# Patient Record
Sex: MALE | Race: ASIAN | NOT HISPANIC OR LATINO | URBAN - METROPOLITAN AREA
[De-identification: names, ages, dates, MRNs, and addresses within clinical notes are randomized per-mention and may not be internally consistent; named-entity substitution may affect disease eponyms.]

---

## 2017-11-10 ENCOUNTER — TELEPHONE (OUTPATIENT)
Dept: FAMILY MEDICINE | Facility: CLINIC | Age: 22
End: 2017-11-10

## 2017-11-10 NOTE — TELEPHONE ENCOUNTER
N is requesting the OT to assess and treat the lymphedema given they specialize in lymphedema care. Gave verbal okay to do this right away.    Routed to Dr. Murray. /JOSE ALEJANDRO Hua

## 2017-11-10 NOTE — TELEPHONE ENCOUNTER
[facility] phone call message- general phone call:    Reason for call: She got a witting note from  for a daily lympedman wrap, she wants to know if its ok to have a ot to gp out and evaluate his frequency need for that.    Return call needed: Yes    OK to leave a message on voice mail? Yes    Primary language: English      needed? No    Call taken on November 10, 2017 at 11:02 AM by Linda Kelly

## 2018-10-12 ENCOUNTER — OFFICE VISIT - HEALTHEAST (OUTPATIENT)
Dept: FAMILY MEDICINE | Facility: CLINIC | Age: 23
End: 2018-10-12

## 2018-10-12 DIAGNOSIS — Z13.220 LIPID SCREENING: ICD-10-CM

## 2018-10-12 DIAGNOSIS — R07.81 RIB PAIN ON LEFT SIDE: ICD-10-CM

## 2018-10-12 DIAGNOSIS — Z11.3 SCREENING FOR STD (SEXUALLY TRANSMITTED DISEASE): ICD-10-CM

## 2018-10-12 DIAGNOSIS — Z00.00 ROUTINE GENERAL MEDICAL EXAMINATION AT A HEALTH CARE FACILITY: ICD-10-CM

## 2018-10-12 DIAGNOSIS — Z13.1 DIABETES MELLITUS SCREENING: ICD-10-CM

## 2018-10-12 LAB
CHOLEST SERPL-MCNC: 172 MG/DL
FASTING STATUS PATIENT QL REPORTED: YES
FASTING STATUS PATIENT QL REPORTED: YES
GLUCOSE BLD-MCNC: 89 MG/DL (ref 70–125)
HDLC SERPL-MCNC: 53 MG/DL
LDLC SERPL CALC-MCNC: 99 MG/DL
TRIGL SERPL-MCNC: 102 MG/DL

## 2018-10-12 ASSESSMENT — MIFFLIN-ST. JEOR: SCORE: 1600.09

## 2018-10-15 ENCOUNTER — COMMUNICATION - HEALTHEAST (OUTPATIENT)
Dept: FAMILY MEDICINE | Facility: CLINIC | Age: 23
End: 2018-10-15

## 2018-10-15 LAB
C TRACH DNA SPEC QL PROBE+SIG AMP: NEGATIVE
N GONORRHOEA DNA SPEC QL NAA+PROBE: NEGATIVE

## 2019-08-09 ENCOUNTER — OFFICE VISIT - HEALTHEAST (OUTPATIENT)
Dept: FAMILY MEDICINE | Facility: CLINIC | Age: 24
End: 2019-08-09

## 2019-08-09 DIAGNOSIS — Z11.3 SCREEN FOR STD (SEXUALLY TRANSMITTED DISEASE): ICD-10-CM

## 2019-08-09 DIAGNOSIS — B35.6 JOCK ITCH: ICD-10-CM

## 2019-08-09 DIAGNOSIS — Z00.00 ROUTINE GENERAL MEDICAL EXAMINATION AT A HEALTH CARE FACILITY: ICD-10-CM

## 2019-08-09 LAB
HBV SURFACE AG SERPL QL IA: NEGATIVE
HCV AB SERPL QL IA: NEGATIVE
HIV 1+2 AB+HIV1 P24 AG SERPL QL IA: NEGATIVE

## 2019-08-10 LAB — T PALLIDUM AB SER QL: NEGATIVE

## 2019-08-12 LAB
C TRACH DNA SPEC QL PROBE+SIG AMP: NEGATIVE
N GONORRHOEA DNA SPEC QL NAA+PROBE: NEGATIVE

## 2019-08-13 ENCOUNTER — COMMUNICATION - HEALTHEAST (OUTPATIENT)
Dept: FAMILY MEDICINE | Facility: CLINIC | Age: 24
End: 2019-08-13

## 2020-07-06 ENCOUNTER — VIRTUAL VISIT (OUTPATIENT)
Dept: FAMILY MEDICINE | Facility: OTHER | Age: 25
End: 2020-07-06

## 2020-07-06 ENCOUNTER — AMBULATORY - HEALTHEAST (OUTPATIENT)
Dept: FAMILY MEDICINE | Facility: CLINIC | Age: 25
End: 2020-07-06

## 2020-07-06 ENCOUNTER — COMMUNICATION - HEALTHEAST (OUTPATIENT)
Dept: SCHEDULING | Facility: CLINIC | Age: 25
End: 2020-07-06

## 2020-07-06 DIAGNOSIS — Z20.822 SUSPECTED COVID-19 VIRUS INFECTION: ICD-10-CM

## 2020-07-06 NOTE — PROGRESS NOTES
"Date: 2020 08:55:20  Clinician: Wilberto Fonseca  Clinician NPI: [id number]  Patient: Rocco Ochoa  Patient : 1995  Patient Address: [facility] [place] [address]  Patient Phone: [phone]  Visit Protocol: URI  Patient Summary:  Rocco is a 25 year old ( : 1995 ) male who initiated a Visit for COVID-19 (Coronavirus) evaluation and screening. When asked the question \"Please sign me up to receive news, health information and promotions from [employer].\", Rocco responded \"No\".    Rocco states his symptoms started suddenly 3-4 days ago. After his symptoms started, they improved and then got worse again.   His symptoms consist of diarrhea, rhinitis, malaise, a sore throat, myalgia, a cough, and nasal congestion. Rocco also feels feverish but was unable to measure his temperature.   Symptom details     Nasal secretions: The color of his mucus is yellow and clear.    Cough: Rocco coughs every 5-10 minutes and his cough is more bothersome at night. Phlegm does not come into his throat when he coughs. He does not believe his cough is caused by post-nasal drip.     Sore throat: Rocco reports having moderate throat pain (4-6 on a 10 point pain scale), does not have exudate on his tonsils, and can swallow liquids. He is not sure if the lymph nodes in his neck are enlarged. A rash has not appeared on the skin since the sore throat started.      Rocco denies having wheezing, nausea, teeth pain, ageusia, vomiting, ear pain, headache, chills, anosmia, and facial pain or pressure. He also denies having recent facial or sinus surgery in the past 60 days, taking antibiotic medication in the past month, and having a sinus infection within the past year. He is not experiencing dyspnea.   Precipitating events  Within the past week, Rocco has been exposed to someone with strep throat. He has not recently been exposed to someone with influenza. Rocco has not been in close contact with any " high risk individuals.   Pertinent COVID-19 (Coronavirus) information  In the past 14 days, Rocco has not worked in a congregate living setting.   He does not work or volunteer as healthcare worker or a  and does not work or volunteer in a healthcare facility.   Rocco also has not lived in a congregate living setting in the past 14 days. He does not live with a healthcare worker.   Rocco has had a close contact with a laboratory-confirmed COVID-19 patient within 14 days of symptom onset. Additional information about contact with COVID-19 (Coronavirus) patient as reported by the patient (free text): My girlfriend got confirmed 7/5/20 that she tested positive for COVID and i was with her the whole time.   Pertinent medical history  Rocco does not need a return to work/school note.   Weight: 160 lbs   Rocco does not smoke or use smokeless tobacco.   Weight: 160 lbs    MEDICATIONS: No current medications, ALLERGIES: NKDA  Clinician Response:  Dear Rocco,   Your symptoms show that you may have coronavirus (COVID-19). This illness can cause fever, cough and trouble breathing. Many people get a mild case and get better on their own. Some people can get very sick.  What should I do?  We would like to test you for this virus.   1. Please call [phone] to schedule your visit. Explain that you were referred by [facility] to have a COVID-19 test. Be ready to share your [facility] visit ID number.  The following will serve as your written order for this COVID Test, ordered by me, for the indication of suspected COVID [Z20.828]: The test will be ordered in [employer], our electronic health record, after you are scheduled. It will show as ordered and authorized by Rey Luciano MD.  Order: COVID-19 (Coronavirus) PCR for SYMPTOMATIC testing from [facility].      2. When it's time for your COVID test:  Stay at least 6 feet away from others. (If someone will drive you to your test, stay in the backseat, as far away from  "the  as you can.)   Cover your mouth and nose with a mask, tissue or washcloth.  Go straight to the testing site. Don't make any stops on the way there or back.      3.Starting now: Stay home and away from others (self-isolate) until:   You've had no fever---and no medicine that reduces fever---for 3 full days (72 hours). And...   Your other symptoms have gotten better. For example, your cough or breathing has improved. And...   At least 10 days have passed since your symptoms started.       During this time, don't leave the house except for testing or medical care.   Stay in your own room, even for meals. Use your own bathroom if you can.   Stay away from others in your home. No hugging, kissing or shaking hands. No visitors.  Don't go to work, school or anywhere else.    Clean \"high touch\" surfaces often (doorknobs, counters, handles, etc.). Use a household cleaning spray or wipes. You'll find a full list of  on the EPA website: www.epa.gov/pesticide-registration/list-n-disinfectants-use-against-sars-cov-2.   Cover your mouth and nose with a mask, tissue or washcloth to avoid spreading germs.  Wash your hands and face often. Use soap and water.  Caregivers in these groups are at risk for severe illness due to COVID-19:  o People 65 years and older  o People who live in a nursing home or long-term care facility  o People with chronic disease (lung, heart, cancer, diabetes, kidney, liver, immunologic)  o People who have a weakened immune system, including those who:   Are in cancer treatment  Take medicine that weakens the immune system, such as corticosteroids  Had a bone marrow or organ transplant  Have an immune deficiency  Have poorly controlled HIV or AIDS  Are obese (body mass index of 40 or higher)  Smoke regularly   o Caregivers should wear gloves while washing dishes, handling laundry and cleaning bedrooms and bathrooms.  o Use caution when washing and drying laundry: Don't shake dirty " laundry, and use the warmest water setting that you can.  o For more tips, go to www.cdc.gov/coronavirus/2019-ncov/downloads/10Things.pdf.    4.Sign up for [employer]. We know it's scary to hear that you might have COVID-19. We want to track your symptoms to make sure you're okay over the next 2 weeks. Please look for an email from [employer]---this is a free, online program that we'll use to keep in touch. To sign up, follow the link in the email. Learn more at [email or web address]  How can I take care of myself?   Get lots of rest. Drink extra fluids (unless a doctor has told you not to).   Take Tylenol (acetaminophen) for fever or pain. If you have liver or kidney problems, ask your family doctor if it's okay to take Tylenol.   Adults can take either:    650 mg (two 325 mg pills) every 4 to 6 hours, or...   1,000 mg (two 500 mg pills) every 8 hours as needed.    Note: Don't take more than 3,000 mg in one day. Acetaminophen is found in many medicines (both prescribed and over-the-counter medicines). Read all labels to be sure you don't take too much.   For children, check the Tylenol bottle for the right dose. The dose is based on the child's age or weight.    If you have other health problems (like cancer, heart failure, an organ transplant or severe kidney disease): Call your specialty clinic if you don't feel better in the next 2 days.       Know when to call 911. Emergency warning signs include:    Trouble breathing or shortness of breath Pain or pressure in the chest that doesn't go away Feeling confused like you haven't felt before, or not being able to wake up Bluish-colored lips or face.  Where can I get more information?    [employer] [facility] -- About COVID-19: [email or web address]   CDC -- What to Do If You're Sick: www.cdc.gov/coronavirus/2019-ncov/about/steps-when-sick.html   CDC -- Ending Home Isolation: www.cdc.gov/coronavirus/2019-ncov/hcp/disposition-in-home-patients.html    CDC -- Caring for Someone: www.cdc.gov/coronavirus/2019-ncov/if-you-are-sick/care-for-someone.html   [facility] -- Interim Guidance for Hospital Discharge to Home: www.health.[facility].[place]/diseases/coronavirus/hcp/hospdischarge.pdf   [facility] clinical trials (COVID-19 research studies): clinicalaffairs.[facility]-clinical-trials    Below are the COVID-19 hotlines at the [place] Department of Health ([facility]). Interpreters are available.    For health questions: Call 528-741-4316 or 1-965.508.2466 (7 a.m. to 7 p.m.) For questions about schools and childcare: Call 889-163-6734 or 1-684.159.9613 (7 a.m. to 7 p.m.)    Diagnosis: Cough  Diagnosis ICD: R05

## 2020-07-07 ENCOUNTER — AMBULATORY - HEALTHEAST (OUTPATIENT)
Dept: FAMILY MEDICINE | Facility: CLINIC | Age: 25
End: 2020-07-07

## 2020-07-07 DIAGNOSIS — Z20.822 SUSPECTED COVID-19 VIRUS INFECTION: ICD-10-CM

## 2020-07-11 ENCOUNTER — COMMUNICATION - HEALTHEAST (OUTPATIENT)
Dept: FAMILY MEDICINE | Facility: CLINIC | Age: 25
End: 2020-07-11

## 2020-12-29 ENCOUNTER — OFFICE VISIT - HEALTHEAST (OUTPATIENT)
Dept: FAMILY MEDICINE | Facility: CLINIC | Age: 25
End: 2020-12-29

## 2020-12-29 DIAGNOSIS — Z00.00 ROUTINE GENERAL MEDICAL EXAMINATION AT A HEALTH CARE FACILITY: ICD-10-CM

## 2020-12-29 ASSESSMENT — MIFFLIN-ST. JEOR: SCORE: 1680.54

## 2021-05-28 ENCOUNTER — RECORDS - HEALTHEAST (OUTPATIENT)
Dept: ADMINISTRATIVE | Facility: CLINIC | Age: 26
End: 2021-05-28

## 2021-05-31 NOTE — TELEPHONE ENCOUNTER
Patient Returning Call  Reason for call:  lmtcb   Information relayed to patient: Relayed results note and patient agrees. Patient has no further questions regarding this matter.   Patient has additional questions:  No  If YES, what are your questions/concerns:  NA  Okay to leave a detailed message?: No call back needed

## 2021-05-31 NOTE — TELEPHONE ENCOUNTER
Left message to call back for: test results within this encounter . Please give Dr Diaz notes below

## 2021-05-31 NOTE — PROGRESS NOTES
Assessment/Plan:   Rocco is a 24-year-old male here for physical    1. Routine general medical examination at a health care facility  Physical completed today.  Vaccines as below.  Labs as below.  Possible varicocele, will continue to monitor.  - Tdap vaccine,  6yo or older,  IM  - HPV vaccine 9 valent 3 dose IM    2. Screen for STD (sexually transmitted disease)  Screen for sexual transmitted diseases as below  - Chlamydia trachomatis, DNA, amp probe  - Hepatitis B Surface antigen (HBsAG)  - Hepatitis C Antibody (Anti-HCV)  - RPR  - HIV Antigen/Antibody Screening Cascade    3. Jock itch  Suspect jock itch based on history, will treat with ketoconazole cream  - ketoconazole (NIZORAL) 2 % cream; Apply topically daily. As needed for itch/rash  Dispense: 30 g; Refill: 0    I have had an Advance Directives discussion with the patient.    Follow up: 1 yr for physical    Luisa Hernandez MD  [facility]    Subjective:     Rocco Ochoa Jr is a 24 y.o. male who presents for an annual exam.     Cough:  -1 week ago, overall getting better but still lots of mucus  -no sore throat or swallowing issues  -no fevers  -no sick contacts (maybe at work)  -no nose or ear issues  -allergic to cats     Possible jock itch:  -pt reports having issues over past week or so with itchy scrotum in the evening after working outside [employer]  -hasn't noticed any specific rash or lesions  -had jock itch a few years ago and treated with cream  -not currently sexually active    Healthy Habits:   Healthy Diet: likes fruits, overall good  Regular Exercise: doing pretty well, works as  and bar on weekends  Sunscreen Use: Yes  Dental Visits Regularly: Yes  Seat Belt: Yes  Wears glasses, due for visit    Health Maintenance reviewed:  Lipid Profile: does not need  Glucose Screen: does not need  Colonoscopy: No    Immunization History   Administered Date(s) Administered     DTaP, historic 1995, 1995, 1995,  05/10/1996, 06/23/2000     HPV 9 Valent 08/09/2019     HPV Quadrivalent 08/28/2012     Hep A, historic 08/26/2011, 08/28/2012     Hep B, historic 1995, 1995, 1995     HiB, historic,unspecified 1995, 1995, 1995, 05/10/1996     IPV 1995, 1995, 1995, 06/23/2000     MMR 05/10/1996, 06/23/2000     Meningococcal MCV4P 07/27/2007, 08/26/2011     Td,adult,historic,unspecified 07/27/2007     Tdap 07/27/2007, 08/09/2019     Immunization status: up to date and documented.    Current Outpatient Medications   Medication Sig Dispense Refill     ketoconazole (NIZORAL) 2 % cream Apply topically daily. As needed for itch/rash 30 g 0     No current facility-administered medications for this visit.      History reviewed. No pertinent past medical history.  Past Surgical History:   Procedure Laterality Date     MANDIBLE SURGERY      under bite     WISDOM TOOTH EXTRACTION       Patient has no known allergies.  Family History   Problem Relation Age of Onset     No Medical Problems Mother      Stroke Father      No Medical Problems Sister      No Medical Problems Maternal Grandmother      No Medical Problems Maternal Grandfather      No Medical Problems Paternal Grandmother      Heart attack Paternal Grandfather      Colon cancer Neg Hx      Prostate cancer Neg Hx      Social History     Socioeconomic History     Marital status: Single     Spouse name: Not on file     Number of children: Not on file     Years of education: Not on file     Highest education level: Not on file   Occupational History     Not on file   Social Needs     Financial resource strain: Not on file     Food insecurity:     Worry: Not on file     Inability: Not on file     Transportation needs:     Medical: Not on file     Non-medical: Not on file   Tobacco Use     Smoking status: Current Some Day Smoker     Smokeless tobacco: Never Used     Tobacco comment: social   Substance and Sexual Activity     Alcohol use:  Yes     Frequency: 2-3 times a week     Drinks per session: 1 or 2     Binge frequency: Never     Drug use: No     Sexual activity: Yes     Partners: Female   Lifestyle     Physical activity:     Days per week: Not on file     Minutes per session: Not on file     Stress: Not on file   Relationships     Social connections:     Talks on phone: Not on file     Gets together: Not on file     Attends Zoroastrianism service: Not on file     Active member of club or organization: Not on file     Attends meetings of clubs or organizations: Not on file     Relationship status: Not on file     Intimate partner violence:     Fear of current or ex partner: Not on file     Emotionally abused: Not on file     Physically abused: Not on file     Forced sexual activity: Not on file   Other Topics Concern     Not on file   Social History Narrative     Not on file       Review of Systems  General:  Denies problem  Eyes: Denies problem  Ears/Nose/Throat: Denies problem  Cardiovascular: Denies problem  Respiratory:  Denies problem  Gastrointestinal:  Denies problem   Genitourinary: Denies problem  Musculoskeletal:  Denies problem  Skin: Denies problem except scrotal itching  Neurologic: Denies problem  Psychiatric: Denies problem  Endocrine: Denies problem  Heme/Lymphatic: Denies problem   Allergic/Immunologic: Denies problem    Objective:        Vitals:    08/09/19 0937   BP: 140/80   Pulse: 73   SpO2: 99%   Weight: 137 lb 5 oz (62.3 kg)     Body mass index is 20.43 kg/m .    Physical Exam:  General Appearance: Alert, pleasant, appears stated age  Head: Normocephalic, without obvious abnormality  Eyes: PERRL, conjunctiva/corneas clear, EOM's intact  Ears: Normal TM's and external ear canals, both ears  Nose: Nares normal, septum midline,mucosa normal, no drainage  Throat: Lips, mucosa, and tongue normal; teeth and gums normal; oropharynx is clear  Neck: Supple,without lymphadenopathy, no thyromegaly or nodules noted  Lungs: Clear to  auscultation bilaterally, respirations unlabored, no wheezing or crackles  Heart: Regular rate and rhythm, no murmur   Abdomen: Soft, non-tender, no masses, no organomegaly  Genitourinary: Testes of normal size without masses, no inguinal hernias; suspect left sided (and possibly small right sided) varicocele but normal testicle size; no rashes or lesions on scrotum  Extremities: Extremities with strong and symmetric pulses, no cyanosis or edema  Skin: Skin color, texture normal, no rashes or lesions  Neurologic: Grossly normal, no focal deficits

## 2021-05-31 NOTE — TELEPHONE ENCOUNTER
----- Message from Ángela Lewis CMA sent at 8/13/2019  8:14 AM CDT -----      ----- Message -----  From: Luisa Hernandez MD  Sent: 8/12/2019   4:31 PM  To: Luisa Hernandez Care Team Pool    Please send letter with test results.    Rocco,    You tested negative for STDs including: HIV, gonorrhea, chlamydia, syphilis and hepatitis. This is great news!    Thanks,  Dr Hernandez

## 2021-06-02 ENCOUNTER — RECORDS - HEALTHEAST (OUTPATIENT)
Dept: ADMINISTRATIVE | Facility: CLINIC | Age: 26
End: 2021-06-02

## 2021-06-02 VITALS — BODY MASS INDEX: 20.69 KG/M2 | HEIGHT: 69 IN | WEIGHT: 139.7 LBS

## 2021-06-03 VITALS — WEIGHT: 137.31 LBS | BODY MASS INDEX: 20.43 KG/M2

## 2021-06-05 VITALS
WEIGHT: 156.56 LBS | BODY MASS INDEX: 23.19 KG/M2 | HEART RATE: 61 BPM | SYSTOLIC BLOOD PRESSURE: 130 MMHG | HEIGHT: 69 IN | DIASTOLIC BLOOD PRESSURE: 80 MMHG

## 2021-06-09 NOTE — TELEPHONE ENCOUNTER
RN Triage:    Spoke with 25 yr old Rocco who c/o:    GF tested positive for COVID-19 yesterday.    Pt reports symptoms as well including:  Occasional mild cough x 10 days.  Muscle aches x 2 days  Fever x 2 days.  Doesn't have a thermometer.  Mild sore throat in the morning.    Pt requests COVID-19 test today.  Advised that COVD-19 testing requires provider order.  Pt is then called and scheduled at a specific location.  Unsure what locations may have availability today.  Pt may check with [facility] website to determine if any sites, do not need a provider order.    PLAN:  Advised [email or web address]. per protocol.  Care advice given per COVID-19 suspected protocol.  Pt intends to log onto [email or web address].    Sallie Walker RN   [facility] RN Triage        Reason for Disposition    [1] COVID-19 infection suspected by caller or triager AND [2] mild symptoms (cough, fever, or others) AND [3] no complications or SOB    Additional Information    Negative: SEVERE difficulty breathing (e.g., struggling for each breath, speaks in single words)    Negative: Difficult to awaken or acting confused (e.g., disoriented, slurred speech)    Negative: Bluish (or gray) lips or face now    Negative: Shock suspected (e.g., cold/pale/clammy skin, too weak to stand, low BP, rapid pulse)    Negative: Sounds like a life-threatening emergency to the triager    Negative: [1] COVID-19 exposure AND [2] no symptoms    Negative: COVID-19 and Breastfeeding, questions about    Negative: [1] Adult with possible COVID-19 symptoms AND [2] triager concerned about severity of symptoms or other causes    Negative: SEVERE or constant chest pain or pressure (Exception: mild central chest pain, present only when coughing)    Negative: MODERATE difficulty breathing (e.g., speaks in phrases, SOB even at rest, pulse 100-120)    Negative: Patient sounds very sick or weak to the triager    Negative: MILD difficulty breathing (e.g., minimal/no SOB at rest, SOB with walking,  pulse <100)    Negative: Chest pain or pressure    Negative: Fever > 103 F (39.4 C)    Negative: [1] Fever > 101 F (38.3 C) AND [2] age > 60    Negative: [1] Fever > 100.0 F (37.8 C) AND [2] bedridden (e.g., nursing home patient, CVA, chronic illness, recovering from surgery)    Negative: HIGH RISK patient (e.g., age > 64 years, diabetes, heart or lung disease, weak immune system)    Negative: Fever present > 3 days (72 hours)    Negative: [1] Fever returns after gone for over 24 hours AND [2] symptoms worse or not improved    Negative: [1] Continuous (nonstop) coughing interferes with work or school AND [2] no improvement using cough treatment per protocol    [facility] Specific Disposition  - [facility] Specific Patient Instructions  COVID 19 Nurse Triage Plan/Patient Instructions    Please be aware that novel coronavirus (COVID-19) may be circulating in the community. If you develop symptoms such as fever, cough, or SOB or if you have concerns about the presence of another infection including coronavirus (COVID-19), please contact your health care provider or visit [email or web address].       Patient to schedule a Virtual Visit with provider. Reference Visit Selection Guide.    Thank you for taking steps to prevent the spread of this virus.  Limit your contact with others.  Wear a simple mask to cover your cough.  Wash your hands well and often.    Resources  [facility]: About COVID-19: [email or web address]  CDC: What to Do If You're Sick: www.cdc.gov/coronavirus/2019-ncov/about/steps-when-sick.html  CDC: Ending Home Isolation: www.cdc.gov/coronavirus/2019-ncov/hcp/disposition-in-home-patients.html   CDC: Caring for Someone: www.cdc.gov/coronavirus/2019-ncov/if-you-are-sick/care-for-someone.html   [facility]: Interim Guidance for Hospital Discharge to Home: www.health.[facility].[place]/diseases/coronavirus/hcp/hospdischarge.pdf  [facility] clinical trials (COVID-19 research studies):  clinicalaffairs.[facility]-clinical-trials   Below are the COVID-19 hotlines at the [place] Department of Health ([facility]). Interpreters are available.   For health questions: Call 026-149-9726 or 1-661.785.2335 (7 a.m. to 7 p.m.)  For questions about schools and childcare: Call 369-331-7337 or 1-606.252.9423 (7 a.m. to 7 p.m.)    Protocols used: CORONAVIRUS (COVID-19) DIAGNOSED OR [id number] 5.16.20

## 2021-06-10 ENCOUNTER — OFFICE VISIT - HEALTHEAST (OUTPATIENT)
Dept: FAMILY MEDICINE | Facility: CLINIC | Age: 26
End: 2021-06-10
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
## 2021-06-14 NOTE — PROGRESS NOTES
Assessment/Plan:   Rocco is a 25 year old male here for physical     1. Routine general medical examination at a health care facility  Physical completed today. No blood work indicated. Pt declines HPV and flu vaccines, otherwise up to date. Blood pressure wnl today.       I have had an Advance Directives discussion with the patient.    Follow up: 1 year for physical, sooner as needed    Luisa Hernandez MD  [facility]    Subjective:     Rocco Ochoa Jr is a 25 y.o. male who presents for an annual exam.     Blood pressure was mildly elevated last year so wanted to check again today, no other concerns  Still working    Healthy Habits:   Healthy Diet: Yes  Regular Exercise: Yes  Sunscreen Use: Yes  Dental Visits Regularly: Yes  Seat Belt: Yes    Health Maintenance reviewed:  Lipid Profile: does not need  Glucose Screen: does not need  Colonoscopy: No    Immunization History   Administered Date(s) Administered     DTaP, historic 1995, 1995, 1995, 05/10/1996, 06/23/2000     HPV 9 Valent 08/09/2019     HPV Quadrivalent 08/28/2012     Hep A, historic 08/26/2011, 08/28/2012     Hep B, historic 1995, 1995, 1995     HiB, historic,unspecified 1995, 1995, 1995, 05/10/1996     IPV 1995, 1995, 1995, 06/23/2000     MMR 05/10/1996, 06/23/2000     Meningococcal MCV4P 07/27/2007, 08/26/2011     Td,adult,historic,unspecified 07/27/2007     Tdap 07/27/2007, 08/09/2019     Immunization status: up to date and documented, declines HPV and flu.    No current outpatient medications on file.     No current facility-administered medications for this visit.      History reviewed. No pertinent past medical history.  Past Surgical History:   Procedure Laterality Date     MANDIBLE SURGERY      under bite     WISDOM TOOTH EXTRACTION       Patient has no known allergies.  Family History   Problem Relation Age of Onset     No Medical Problems Mother      Stroke  Father      No Medical Problems Sister      No Medical Problems Maternal Grandmother      No Medical Problems Maternal Grandfather      No Medical Problems Paternal Grandmother      Heart attack Paternal Grandfather      Colon cancer Neg Hx      Prostate cancer Neg Hx      Social History     Socioeconomic History     Marital status: Single     Spouse name: Not on file     Number of children: Not on file     Years of education: Not on file     Highest education level: Not on file   Occupational History     Not on file   Social Needs     Financial resource strain: Not on file     Food insecurity     Worry: Not on file     Inability: Not on file     Transportation needs     Medical: Not on file     Non-medical: Not on file   Tobacco Use     Smoking status: Current Some Day Smoker     Smokeless tobacco: Never Used     Tobacco comment: social   Substance and Sexual Activity     Alcohol use: Yes     Frequency: 2-3 times a week     Drinks per session: 1 or 2     Binge frequency: Never     Drug use: No     Sexual activity: Yes     Partners: Female   Lifestyle     Physical activity     Days per week: Not on file     Minutes per session: Not on file     Stress: Not on file   Relationships     Social connections     Talks on phone: Not on file     Gets together: Not on file     Attends Voodoo service: Not on file     Active member of club or organization: Not on file     Attends meetings of clubs or organizations: Not on file     Relationship status: Not on file     Intimate partner violence     Fear of current or ex partner: Not on file     Emotionally abused: Not on file     Physically abused: Not on file     Forced sexual activity: Not on file   Other Topics Concern     Not on file   Social History Narrative     Not on file       Review of Systems  General:  Denies problem  Eyes: Denies problem  Ears/Nose/Throat: Denies problem  Cardiovascular: Denies problem  Respiratory:  Denies problem  Gastrointestinal:  Denies  "problem   Genitourinary: Denies problem  Musculoskeletal:  Denies problem  Skin: Denies problem  Neurologic: Denies problem  Psychiatric: Denies problem  Endocrine: Denies problem  Heme/Lymphatic: Denies problem   Allergic/Immunologic: Denies problem    Objective:        Vitals:    12/29/20 0805   BP: 130/80   Pulse: 61   Weight: 156 lb 9 oz (71 kg)   Height: 5' 9\" (1.753 m)     Body mass index is 23.12 kg/m .    Physical Exam:  General Appearance: Alert, pleasant, appears stated age  Head: Normocephalic, without obvious abnormality  Eyes: PERRL, conjunctiva/corneas clear, EOM's intact  Ears: Normal TM's and external ear canals, both ears  Nose: Nares normal, septum midline,mucosa normal, no drainage  Throat: Lips, mucosa, and tongue normal; teeth and gums normal; oropharynx is clear  Neck: Supple,without lymphadenopathy, no thyromegaly or nodules noted  Lungs: Clear to auscultation bilaterally, respirations unlabored, no wheezing or crackles  Heart: Regular rate and rhythm, no murmur   Abdomen: Soft, non-tender, no masses, no organomegaly  Genitourinary: Testes of normal size without masses, no inguinal hernias  Extremities: Extremities with strong and symmetric pulses, no cyanosis or edema  Skin: Skin color, texture normal, no rashes or lesions  Neurologic: Grossly normal, no focal deficits    "

## 2021-06-20 NOTE — LETTER
Letter by Portia Alvarenga LPN at      Author: Portia Alvarenga LPN Service: -- Author Type: --    Filed:  Encounter Date: 7/11/2020 Status: (Other)       7/11/2020        Rocco Ochoa Jr  [address]  [place] [address]    This letter provides a written record that you were tested for COVID-19 on 7/7/2020.     Your result was negative. This means that we didnt find the virus that causes COVID-19 in your sample. A test may show negative when you do actually have the virus. This can happen when the virus is in the early stages of infection, before you feel illness symptoms.    If you have symptoms   Stay home and away from others (self-isolate) until you meet ALL of the guidelines below:    Youve had no fever--and no medicine that reduces fever--for 3 full days (72 hours). And ?    Your other symptoms have gotten better. For example, your cough or breathing has improved. And?    At least 10 days have passed since your symptoms started.    During this time:    Stay home. Dont go to work, school or anywhere else.     Stay in your own room, including for meals. Use your own bathroom if you can.    Stay away from others in your home. No hugging, kissing or shaking hands. No visitors.    Clean high touch surfaces often (doorknobs, counters, handles, etc.). Use a household cleaning spray or wipes. You can find a full list on the EPA website at www.epa.gov/pesticide-registration/list-n-disinfectants-use-against-sars-cov-2.    Cover your mouth and nose with a mask, tissue or washcloth to avoid spreading germs.    Wash your hands and face often with soap and water.    Going back to work  Check with your employer for any guidelines to follow for going back to work.    Employers: This document serves as formal notice that your employee tested negative for COVID-19, as of the testing date shown above.

## 2021-06-21 NOTE — PROGRESS NOTES
Assessment and Plan:     1. Routine general medical examination at a health care facility  Discussed consuming a healthy diet and exercising.  Discussed importance of routine sunscreen.  He declines TD, Gardasil, and influenza vaccines.    2. Diabetes mellitus screening  - Glucose    3. Lipid screening  - Lipid [place]    4. Screening for STD (sexually transmitted disease)  Discussed safe sex practices.  Will notify patient of results.  - Chlamydia trachomatis & Neisseria gonorrhoeae, Amplified Detection    5. Rib pain on left side  Differentials include muscular strain, rib fracture, rib contusion.  Discussed symptomatic treatment including rest, ice, NSAIDs.  Offered x-ray, but he declines.  Discussed concerning symptoms including worsening pain, shortness of breath, dyspnea, abdominal pain, fever.  If this occurs, suggest follow-up.  He is content with the plan.      Subjective:     Rocco is a 23 y.o. male presenting to the clinic for a male physical.  Patient is single and is sexually active.  He would like an STD check today.  He denies symptoms including dysuria, penile discharge, penile sores.  He is exercising 2 times per week through strength training and cardio.  He does not eat healthy.  Patient has been experiencing left rib pain since Thursday of last week.  He denies any injury.  He complains of an intermittent soreness which occurs with coughing or getting out of bed.  His symptoms have gradually improved.  He states the area is tender to touch.  He has not noticed any bruising or erythema.  He denies chest pain, chest tightness, shortness of breath, wheezing, abdominal pain, fever.  He denies any recent travel.    Review of Systems: A complete 14 point review of systems was obtained and is negative or as stated in the history of present illness.    Social History     Social History     Marital status: Single     Spouse name: N/A     Number of children: N/A     Years of education: N/A  "    Occupational History     Not on file.     Social History Main Topics     Smoking status: Current Some Day Smoker     Smokeless tobacco: Never Used     Alcohol use 3.0 oz/week     5 Shots of liquor per week     Drug use: No     Sexual activity: Not on file      Comment: single      Other Topics Concern     Not on file     Social History Narrative       Active Ambulatory Problems     Diagnosis Date Noted     No Active Ambulatory Problems     Resolved Ambulatory Problems     Diagnosis Date Noted     No Resolved Ambulatory Problems     No Additional Past Medical History       Family History   Problem Relation Age of Onset     No Medical Problems Mother      Stroke Father        Objective:     /80  Pulse 80  Ht 5' 8.75\" (1.746 m)  Wt 139 lb 11.2 oz (63.4 kg)  BMI 20.78 kg/m2    General Appearance:    Alert, cooperative, no distress, appears stated age   Head:    Normocephalic, without obvious abnormality, atraumatic   Eyes:    PERRL, conjunctiva/corneas clear, EOM's intact, fundi     benign, both eyes        Ears:    Normal TM's and external ear canals, both ears   Nose:   Nares normal, septum midline, mucosa normal, no drainage    or sinus tenderness   Throat:   Lips, mucosa, and tongue normal; teeth and gums normal   Neck:   Supple, symmetrical, trachea midline, no adenopathy;        thyroid:  No enlargement/tenderness/nodules; no carotid    bruit or JVD   Back:     Symmetric, no curvature, ROM normal, no CVA tenderness   Lungs:     Clear to auscultation bilaterally, respirations unlabored   Chest wall:    No tenderness or deformity   Heart:    Regular rate and rhythm, S1 and S2 normal, no murmur, rub    or gallop   Abdomen:     Soft, non-tender, bowel sounds active all four quadrants,     no masses, no organomegaly.  He is tender to palpation of his left upper anterior ribs.  There are no areas of erythema, ecchymosis.     Genitalia:    Normal male without lesion, discharge or tenderness.  No hernias " palpated bilaterally.        Extremities:   Extremities normal, atraumatic, no cyanosis or edema   Pulses:   2+ and symmetric all extremities   Skin:   Skin color, texture, turgor normal, no rashes or lesions   Lymph nodes:   Cervical, supraclavicular, and axillary nodes normal   Neurologic:   CNII-XII intact. Normal strength, sensation and reflexes       throughout

## 2021-06-26 NOTE — PROGRESS NOTES
Assessment:     1. Bug bite with infection, sequela  cephalexin (KEFLEX) 500 MG capsule       Plan:     1. Bug bite with infection, sequela  Patient will take the following medications 4 times daily and put a Band-Aid over the eschar and allow some air to get on it  - cephalexin (KEFLEX) 500 MG capsule; Take 1 capsule (500 mg total) by mouth 4 (four) times a day for 7 days.  Dispense: 28 capsule; Refill: 0      Subjective:   The patient is a  and approximately 5 days ago he was bitten by an unknown vector and developed a reddened bump which he scratched and subsequently it became white and developed a little pus and eventually formed a scab over it however it is now red and inflamed and area around the size of a $0.50 piece axillary nodes are negative.  To be on the safe side and to treat him with some Keflex as he has no allergies we will have him place a Band-Aid over the area but allow some air to get on it firm up the eschar.  Unfortunately it is over a rather fancy full tiger tattoo on his forearm and hopefully this will not keloid over on the patient.  Very nice young man.    Review of Systems: A complete 14 point review of systems was obtained and is negative or as stated in the history of present illness.    No past medical history on file.  Family History   Problem Relation Age of Onset     No Medical Problems Mother      Stroke Father      No Medical Problems Sister      No Medical Problems Maternal Grandmother      No Medical Problems Maternal Grandfather      No Medical Problems Paternal Grandmother      Heart attack Paternal Grandfather      Colon cancer Neg Hx      Prostate cancer Neg Hx      Past Surgical History:   Procedure Laterality Date     MANDIBLE SURGERY      under bite     WISDOM TOOTH EXTRACTION       Social History     Tobacco Use     Smoking status: Current Some Day Smoker     Smokeless tobacco: Never Used     Tobacco comment: social   Substance Use Topics     Alcohol use: Yes      Frequency: 2-3 times a week     Drinks per session: 1 or 2     Binge frequency: Never     Drug use: No         Objective:   /74   Pulse 75   Wt 140 lb (63.5 kg)   SpO2 99%   BMI 20.67 kg/m      General Appearance:  Normal  Head:  Normal  Ears: Normal  Eyes:  Normal  Nose:  Normal  Throat:  Normal  Neck:  Normal  Back:  Normal  Chest/Breast:Normal  Lungs:  Normal  Heart:  Normal  Abdomen:  Normal  Musculoskeletal:  Normal  Lymphatic:  Normal  Skin/Hair/Nails:  Normal  Neurologic:  Normal  Extremities:  Normal  Genitourinary: Normal  Pulses:  Normal           This note has been dictated using voice recognition software. Any grammatical or context distortions are unintentional and inherent to the the software.

## 2021-07-06 VITALS
BODY MASS INDEX: 20.67 KG/M2 | HEART RATE: 75 BPM | OXYGEN SATURATION: 99 % | DIASTOLIC BLOOD PRESSURE: 74 MMHG | SYSTOLIC BLOOD PRESSURE: 124 MMHG | WEIGHT: 140 LBS